# Patient Record
Sex: FEMALE | URBAN - METROPOLITAN AREA
[De-identification: names, ages, dates, MRNs, and addresses within clinical notes are randomized per-mention and may not be internally consistent; named-entity substitution may affect disease eponyms.]

---

## 2022-08-29 ENCOUNTER — OFFICE VISIT (OUTPATIENT)
Dept: URBAN - METROPOLITAN AREA CLINIC 36 | Facility: CLINIC | Age: 77
End: 2022-08-29
Payer: COMMERCIAL

## 2022-08-29 DIAGNOSIS — H40.9 GLAUCOMA: ICD-10-CM

## 2022-08-29 DIAGNOSIS — Z96.1 PRESENCE OF INTRAOCULAR LENS: ICD-10-CM

## 2022-08-29 DIAGNOSIS — H35.033 HYPERTENSIVE RETINOPATHY, BILATERAL: ICD-10-CM

## 2022-08-29 DIAGNOSIS — H34.8130 CENTRAL RETINAL VEIN OCCLUSION, BILATERAL, WITH MACULAR EDEMA: Primary | ICD-10-CM

## 2022-08-29 PROCEDURE — 92134 CPTRZ OPH DX IMG PST SGM RTA: CPT | Performed by: OPHTHALMOLOGY

## 2022-08-29 PROCEDURE — 99204 OFFICE O/P NEW MOD 45 MIN: CPT | Performed by: OPHTHALMOLOGY

## 2022-08-29 PROCEDURE — 67028 INJECTION EYE DRUG: CPT | Performed by: OPHTHALMOLOGY

## 2022-08-29 ASSESSMENT — INTRAOCULAR PRESSURE
OS: 21
OD: 16

## 2022-08-29 NOTE — IMPRESSION/PLAN
Impression: Presence of intraocular lens: Z96.1. Bilateral. Plan:  PC IOL in good position.  Observe

## 2022-08-29 NOTE — IMPRESSION/PLAN
Impression: Central retinal vein occlusion, with macular edema, OD>OS: H34.8130.
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 THe findings are consistent with HTN retinopathy vs CRVO. The diagnosis, natural history, prognosis, and R/B/A of the various treatment options were discussed at length. We discussed that treatment may or may not improve vision, but should reduce the risk of further visual loss; we discussed that repeat treatment is usually necessary to maintain any vision gains and prevent further vision loss. Recommend intravitreal Avastin injection today OD and observation. R/B/A discussed and patient elects to proceed. Intravitreal Avastin was injected OD without complication. Yakelin Olivo The patient was educated regarding the systemic conditions associated with a RVO and was strongly advised to schedule an appointment with their PCP for a full medical evaluation and appropriate bloodwork within the next 2 weeks. 

RTC 4 weeks with OCT OU, poss Avastin

## 2022-10-03 ENCOUNTER — OFFICE VISIT (OUTPATIENT)
Dept: URBAN - METROPOLITAN AREA CLINIC 36 | Facility: CLINIC | Age: 77
End: 2022-10-03
Payer: COMMERCIAL

## 2022-10-03 DIAGNOSIS — Z96.1 PRESENCE OF INTRAOCULAR LENS: ICD-10-CM

## 2022-10-03 DIAGNOSIS — H34.8130 CENTRAL RETINAL VEIN OCCLUSION, BILATERAL, WITH MACULAR EDEMA: Primary | ICD-10-CM

## 2022-10-03 DIAGNOSIS — H40.9 GLAUCOMA: ICD-10-CM

## 2022-10-03 DIAGNOSIS — H35.033 HYPERTENSIVE RETINOPATHY, BILATERAL: ICD-10-CM

## 2022-10-03 PROCEDURE — 67028 INJECTION EYE DRUG: CPT | Performed by: OPHTHALMOLOGY

## 2022-10-03 PROCEDURE — 92134 CPTRZ OPH DX IMG PST SGM RTA: CPT | Performed by: OPHTHALMOLOGY

## 2022-10-03 PROCEDURE — 99214 OFFICE O/P EST MOD 30 MIN: CPT | Performed by: OPHTHALMOLOGY

## 2022-10-03 ASSESSMENT — INTRAOCULAR PRESSURE
OS: 20
OD: 16

## 2022-10-03 NOTE — IMPRESSION/PLAN
Impression: Glaucoma: H40.9. Plan: Exam demonstrates glaucomatous cupping. Fortunately, there is no NVI today. IOP is borderline OS and there is no progression of ON cupping. Discussed surgery vs laser vs gtts vs observation.   Recommend CPM

## 2022-10-03 NOTE — IMPRESSION/PLAN
Impression: Central retinal vein occlusion, with macular edema, OD>OS: H34.8130.
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
-s/p Avastin last 08/29/2022 Plan: Exam and OCT demonstrate scattered MAs with improved CME OD and slightly worse CME OS. The findings are consistent with HTN retinopathy vs CRVO with improvement OD after Avastin. The diagnosis, natural history, prognosis, and R/B/A of the various treatment options were discussed at length. We discussed that treatment may or may not improve vision, but should reduce the risk of further visual loss; we discussed that repeat treatment is usually necessary to maintain any vision gains and prevent further vision loss. Recommend intravitreal Avastin injection today OD and close observation OS. R/B/A discussed and patient elects to proceed. Intravitreal Avastin was injected OD without complication. Chaz Bo Close Observation OS.  

RTC 4 weeks with OCT OU, poss Avastin (OD vs OU)

## 2022-10-31 ENCOUNTER — OFFICE VISIT (OUTPATIENT)
Dept: URBAN - METROPOLITAN AREA CLINIC 36 | Facility: CLINIC | Age: 77
End: 2022-10-31
Payer: COMMERCIAL

## 2022-10-31 DIAGNOSIS — H34.8130 CENTRAL RETINAL VEIN OCCLUSION, BILATERAL, WITH MACULAR EDEMA: Primary | ICD-10-CM

## 2022-10-31 DIAGNOSIS — H40.9 GLAUCOMA: ICD-10-CM

## 2022-10-31 DIAGNOSIS — Z96.1 PRESENCE OF INTRAOCULAR LENS: ICD-10-CM

## 2022-10-31 DIAGNOSIS — H35.033 HYPERTENSIVE RETINOPATHY, BILATERAL: ICD-10-CM

## 2022-10-31 PROCEDURE — 92012 INTRM OPH EXAM EST PATIENT: CPT | Performed by: OPHTHALMOLOGY

## 2022-10-31 PROCEDURE — 67028 INJECTION EYE DRUG: CPT | Performed by: OPHTHALMOLOGY

## 2022-10-31 PROCEDURE — 92134 CPTRZ OPH DX IMG PST SGM RTA: CPT | Performed by: OPHTHALMOLOGY

## 2022-10-31 ASSESSMENT — INTRAOCULAR PRESSURE
OS: 18
OD: 20

## 2022-10-31 NOTE — IMPRESSION/PLAN
Impression: Central retinal vein occlusion, with macular edema, OD>OS: H34.8130.
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
-s/p Avastin OD; 10/03/2022 Plan: Exam and OCT demonstrate scattered MAs with persistent CME OD and stable CME OS. The findings are consistent with HTN retinopathy vs CRVO. The diagnosis, natural history, prognosis, and R/B/A of the various treatment options were discussed at length. We discussed that treatment may or may not improve vision, but should reduce the risk of further visual loss; we discussed that repeat treatment is usually necessary to maintain any vision gains and prevent further vision loss. Recommend intravitreal Avastin injection today OD and switch to [facility] given the persistent CME and blurry vision on Avastin. Rec continued close observation OS. R/B/A discussed and patient elects to proceed. Intravitreal Avastin was injected OD without complication. Elmer Kelly Close Observation OS.  

RTC 4 weeks with OCT OU, poss Eylea (OD vs OU)

## 2022-11-28 ENCOUNTER — OFFICE VISIT (OUTPATIENT)
Dept: URBAN - METROPOLITAN AREA CLINIC 36 | Facility: CLINIC | Age: 77
End: 2022-11-28
Payer: MEDICARE

## 2022-11-28 DIAGNOSIS — H40.9 GLAUCOMA: ICD-10-CM

## 2022-11-28 DIAGNOSIS — Z96.1 PRESENCE OF INTRAOCULAR LENS: ICD-10-CM

## 2022-11-28 DIAGNOSIS — H35.033 HYPERTENSIVE RETINOPATHY, BILATERAL: ICD-10-CM

## 2022-11-28 DIAGNOSIS — H34.8130 CENTRAL RETINAL VEIN OCCLUSION, BILATERAL, WITH MACULAR EDEMA: Primary | ICD-10-CM

## 2022-11-28 PROCEDURE — 67028 INJECTION EYE DRUG: CPT | Performed by: OPHTHALMOLOGY

## 2022-11-28 PROCEDURE — 99214 OFFICE O/P EST MOD 30 MIN: CPT | Performed by: OPHTHALMOLOGY

## 2022-11-28 PROCEDURE — 92134 CPTRZ OPH DX IMG PST SGM RTA: CPT | Performed by: OPHTHALMOLOGY

## 2022-11-28 ASSESSMENT — INTRAOCULAR PRESSURE
OS: 22
OD: 16

## 2022-11-28 NOTE — IMPRESSION/PLAN
Impression: Central retinal vein occlusion, with macular edema, OD>OS: H34.8130.
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
-s/p Avastin OD 10/31/2022 Plan: Exam and OCT demonstrate scattered MAs with improved CME OD and stable CME OS. The findings are consistent with HTN retinopathy vs CRVO. Recommend intravitreal Avastin injection today OD and switch to [facility] given the persistent CME and blurry vision on Avastin. Rec continued close observation OS. R/B/A discussed and patient elects to proceed. Intravitreal Avastin was injected OD without complication. Tsering Zacarias Close Observation OS.  

RTC 4-6 weeks with OCT OU, poss Eylea (OD vs OU)

## 2023-01-23 ENCOUNTER — OFFICE VISIT (OUTPATIENT)
Dept: URBAN - METROPOLITAN AREA CLINIC 36 | Facility: CLINIC | Age: 78
End: 2023-01-23
Payer: MEDICARE

## 2023-01-23 DIAGNOSIS — Z96.1 PRESENCE OF INTRAOCULAR LENS: ICD-10-CM

## 2023-01-23 DIAGNOSIS — H40.9 GLAUCOMA: ICD-10-CM

## 2023-01-23 DIAGNOSIS — H35.033 HYPERTENSIVE RETINOPATHY, BILATERAL: ICD-10-CM

## 2023-01-23 DIAGNOSIS — H34.8130 CENTRAL RETINAL VEIN OCCLUSION, BILATERAL, WITH MACULAR EDEMA: Primary | ICD-10-CM

## 2023-01-23 PROCEDURE — 99214 OFFICE O/P EST MOD 30 MIN: CPT | Performed by: OPHTHALMOLOGY

## 2023-01-23 PROCEDURE — 92134 CPTRZ OPH DX IMG PST SGM RTA: CPT | Performed by: OPHTHALMOLOGY

## 2023-01-23 ASSESSMENT — INTRAOCULAR PRESSURE
OD: 19
OS: 21

## 2023-01-23 NOTE — IMPRESSION/PLAN
Impression: Central retinal vein occlusion, with macular edema, OD>OS: H34.8130.
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
-s/p Avastin OD 11/28/2022 Plan: Exam and OCT demonstrate scattered MAs with persistent CME OD and CME OS. The findings are consistent with HTN retinopathy vs CRVO. Recommend intravitreal Avastin injection today OD and switch to [facility] given the persistent CME and blurry vision on Avastin. Rec trial of Avastin OS today given persistent CME.  R/B/A discussed and patient elects to proceed. Intravitreal Avastin was injected OD without complication. Prisca Mosher Intravitreal Avastin #1 injection performed OS today without complication RTC 4-6 weeks with OCT OU, poss Eylea (OD vs OU)

## 2023-03-06 ENCOUNTER — OFFICE VISIT (OUTPATIENT)
Dept: URBAN - METROPOLITAN AREA CLINIC 36 | Facility: CLINIC | Age: 78
End: 2023-03-06
Payer: MEDICARE

## 2023-03-06 DIAGNOSIS — H40.9 GLAUCOMA: ICD-10-CM

## 2023-03-06 DIAGNOSIS — H35.033 HYPERTENSIVE RETINOPATHY, BILATERAL: ICD-10-CM

## 2023-03-06 DIAGNOSIS — H34.8130 CENTRAL RETINAL VEIN OCCLUSION, BILATERAL, WITH MACULAR EDEMA: Primary | ICD-10-CM

## 2023-03-06 DIAGNOSIS — Z96.1 PRESENCE OF INTRAOCULAR LENS: ICD-10-CM

## 2023-03-06 PROCEDURE — 92134 CPTRZ OPH DX IMG PST SGM RTA: CPT | Performed by: OPHTHALMOLOGY

## 2023-03-06 PROCEDURE — 99214 OFFICE O/P EST MOD 30 MIN: CPT | Performed by: OPHTHALMOLOGY

## 2023-03-06 ASSESSMENT — INTRAOCULAR PRESSURE
OS: 18
OD: 15

## 2023-03-06 NOTE — IMPRESSION/PLAN
Impression: Presence of intraocular lens: Z96.1. Bilateral. Plan: PC IOL in good position.  Observe 18-Jan-2019 15:45

## 2023-03-06 NOTE — IMPRESSION/PLAN
Impression: Central retinal vein occlusion, with macular edema, OD>OS: H34.8130.
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
-s/p Avastin OU 01/23/2023 Plan: Exam and OCT demonstrate scattered MAs with persistent CME OD and CME OS. The findings are consistent with HTN retinopathy vs CRVO. Recommend switch to [facility] given the persistent CME and blurry vision on Avastin. R/B/A discussed and patient elects to proceed. Intravitreal Eylea was injected OD without complication. Joanne Goldberg Intravitreal Eylea injection performed OS today without complication RTC 4-6 weeks with OCT OU, poss Eylea (OD vs OU)

## 2023-04-03 ENCOUNTER — OFFICE VISIT (OUTPATIENT)
Dept: URBAN - METROPOLITAN AREA CLINIC 36 | Facility: CLINIC | Age: 78
End: 2023-04-03
Payer: MEDICARE

## 2023-04-03 DIAGNOSIS — H35.033 HYPERTENSIVE RETINOPATHY, BILATERAL: ICD-10-CM

## 2023-04-03 DIAGNOSIS — H40.9 GLAUCOMA: ICD-10-CM

## 2023-04-03 DIAGNOSIS — S05.12XA CONTUSION OF EYEBALL AND ORBITAL TISSUES, LEFT EYE, INIT: ICD-10-CM

## 2023-04-03 DIAGNOSIS — Z96.1 PRESENCE OF INTRAOCULAR LENS: ICD-10-CM

## 2023-04-03 DIAGNOSIS — H34.8130 CENTRAL RETINAL VEIN OCCLUSION, BILATERAL, WITH MACULAR EDEMA: Primary | ICD-10-CM

## 2023-04-03 PROCEDURE — 92134 CPTRZ OPH DX IMG PST SGM RTA: CPT | Performed by: OPHTHALMOLOGY

## 2023-04-03 PROCEDURE — 92012 INTRM OPH EXAM EST PATIENT: CPT | Performed by: OPHTHALMOLOGY

## 2023-04-03 ASSESSMENT — INTRAOCULAR PRESSURE
OD: 13
OS: 17

## 2023-04-03 NOTE — IMPRESSION/PLAN
Impression: Contusion of eyeball and orbital tissues, OS>OD, init: S05.12xA. Plan: Patient recently fell. Exam demonstrates periocular ecchymosis. No evidence of orbital fracture. No intraocular involvement.

## 2023-04-03 NOTE — IMPRESSION/PLAN
Impression: Central retinal vein occlusion, with macular edema, OD>OS: H34.8130.
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
-s/p Avastin OU 03/06/2023
-s/p Eylea 03/06/2023 Plan: Exam and OCT demonstrate scattered MAs with persistent CME OD and CME OS; this is improved after switch to [facility]. The findings are consistent with HTN retinopathy vs CRVO. Recommend observation today given recent periocular trauma RTC 4-6 weeks with OCT OU, poss Eylea (OD vs OU)

## 2023-05-01 ENCOUNTER — OFFICE VISIT (OUTPATIENT)
Dept: URBAN - METROPOLITAN AREA CLINIC 36 | Facility: CLINIC | Age: 78
End: 2023-05-01
Payer: MEDICARE

## 2023-05-01 DIAGNOSIS — H35.033 HYPERTENSIVE RETINOPATHY, BILATERAL: ICD-10-CM

## 2023-05-01 DIAGNOSIS — S05.12XA CONTUSION OF EYEBALL AND ORBITAL TISSUES, LEFT EYE, INIT: ICD-10-CM

## 2023-05-01 DIAGNOSIS — H40.9 GLAUCOMA: ICD-10-CM

## 2023-05-01 DIAGNOSIS — H34.8130 CENTRAL RETINAL VEIN OCCLUSION, BILATERAL, WITH MACULAR EDEMA: Primary | ICD-10-CM

## 2023-05-01 DIAGNOSIS — Z96.1 PRESENCE OF INTRAOCULAR LENS: ICD-10-CM

## 2023-05-01 PROCEDURE — 92012 INTRM OPH EXAM EST PATIENT: CPT | Performed by: OPHTHALMOLOGY

## 2023-05-01 PROCEDURE — 92134 CPTRZ OPH DX IMG PST SGM RTA: CPT | Performed by: OPHTHALMOLOGY

## 2023-05-01 ASSESSMENT — INTRAOCULAR PRESSURE
OD: 16
OS: 16

## 2023-05-01 NOTE — IMPRESSION/PLAN
Impression: Contusion of eyeball and orbital tissues, OS>OD, init: S05.12xA. Plan: Due to Fall. Exam demonstrates improved periocular ecchymosis. No evidence of orbital fracture. No intraocular involvement.

## 2023-05-01 NOTE — IMPRESSION/PLAN
Impression: Central retinal vein occlusion, with macular edema, OD>OS: H34.8130.
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
-s/p Avastin OU 03/06/2023
-s/p Eylea 03/06/2023 Plan: Exam and OCT demonstrate scattered MAs with slightly worse CME OD and CME OS at 8 wks. The findings are consistent with HTN retinopathy vs CRVO. Recommend Eylea today OU. R/B/A discussed and patient elects to proceed. Intravitreal Eylea injected OU today without complication.  

RTC 6-8 weeks with OCT OU, poss Eylea (OD vs OU)

## 2023-06-26 ENCOUNTER — PROCEDURE (OUTPATIENT)
Dept: URBAN - METROPOLITAN AREA CLINIC 36 | Facility: CLINIC | Age: 78
End: 2023-06-26
Payer: MEDICARE

## 2023-06-26 DIAGNOSIS — S05.12XA CONTUSION OF EYEBALL AND ORBITAL TISSUES, LEFT EYE, INIT: ICD-10-CM

## 2023-06-26 DIAGNOSIS — Z96.1 PRESENCE OF INTRAOCULAR LENS: ICD-10-CM

## 2023-06-26 DIAGNOSIS — H35.033 HYPERTENSIVE RETINOPATHY, BILATERAL: ICD-10-CM

## 2023-06-26 DIAGNOSIS — H40.9 GLAUCOMA: ICD-10-CM

## 2023-06-26 DIAGNOSIS — H34.8130 CENTRAL RETINAL VEIN OCCLUSION, BILATERAL, WITH MACULAR EDEMA: Primary | ICD-10-CM

## 2023-06-26 PROCEDURE — 92014 COMPRE OPH EXAM EST PT 1/>: CPT | Performed by: OPHTHALMOLOGY

## 2023-06-26 PROCEDURE — 92134 CPTRZ OPH DX IMG PST SGM RTA: CPT | Performed by: OPHTHALMOLOGY

## 2023-06-26 ASSESSMENT — INTRAOCULAR PRESSURE
OD: 13
OS: 18

## 2023-06-26 NOTE — IMPRESSION/PLAN
Impression: Central retinal vein occlusion, with macular edema, OD>OS: H34.8130.
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
-s/p Avastin OU 03/06/2023
-s/p Eylea 05/01/2023 Plan: Exam and OCT demonstrate scattered MAs with slightly worse CME OD and CME OS at 8 wks. The findings are consistent with HTN retinopathy vs CRVO. Recommend Eylea today OU. R/B/A discussed and patient elects to proceed. Intravitreal Eylea injected OU today without complication.  

RTC 6-8 weeks with OCT OU, poss Eylea (OD vs OU)

## 2023-08-09 ENCOUNTER — OFFICE VISIT (OUTPATIENT)
Facility: LOCATION | Age: 78
End: 2023-08-09
Payer: MEDICARE

## 2023-08-09 DIAGNOSIS — H34.8130 CENTRAL RETINAL VEIN OCCLUSION, BILATERAL, WITH MACULAR EDEMA: Primary | ICD-10-CM

## 2023-08-09 DIAGNOSIS — H40.9 GLAUCOMA: ICD-10-CM

## 2023-08-09 DIAGNOSIS — S05.12XA CONTUSION OF EYEBALL AND ORBITAL TISSUES, LEFT EYE, INIT: ICD-10-CM

## 2023-08-09 DIAGNOSIS — H35.033 HYPERTENSIVE RETINOPATHY, BILATERAL: ICD-10-CM

## 2023-08-09 DIAGNOSIS — Z96.1 PRESENCE OF INTRAOCULAR LENS: ICD-10-CM

## 2023-08-09 PROCEDURE — 99214 OFFICE O/P EST MOD 30 MIN: CPT | Performed by: OPHTHALMOLOGY

## 2023-08-09 PROCEDURE — 92134 CPTRZ OPH DX IMG PST SGM RTA: CPT | Performed by: OPHTHALMOLOGY

## 2023-08-09 ASSESSMENT — INTRAOCULAR PRESSURE
OD: 21
OS: 22

## 2023-10-04 ENCOUNTER — OFFICE VISIT (OUTPATIENT)
Dept: URBAN - METROPOLITAN AREA CLINIC 41 | Facility: CLINIC | Age: 78
End: 2023-10-04
Payer: MEDICARE

## 2023-10-04 DIAGNOSIS — Z96.1 PRESENCE OF INTRAOCULAR LENS: ICD-10-CM

## 2023-10-04 DIAGNOSIS — H34.8130 CENTRAL RETINAL VEIN OCCLUSION, BILATERAL, WITH MACULAR EDEMA: Primary | ICD-10-CM

## 2023-10-04 DIAGNOSIS — H02.409 PTOSIS OF EYELID: ICD-10-CM

## 2023-10-04 DIAGNOSIS — H40.9 GLAUCOMA: ICD-10-CM

## 2023-10-04 DIAGNOSIS — H35.033 HYPERTENSIVE RETINOPATHY, BILATERAL: ICD-10-CM

## 2023-10-04 DIAGNOSIS — S05.12XA CONTUSION OF EYEBALL AND ORBITAL TISSUES, LEFT EYE, INIT: ICD-10-CM

## 2023-10-04 PROCEDURE — 92134 CPTRZ OPH DX IMG PST SGM RTA: CPT | Performed by: OPHTHALMOLOGY

## 2023-10-04 PROCEDURE — 92014 COMPRE OPH EXAM EST PT 1/>: CPT | Performed by: OPHTHALMOLOGY

## 2023-10-04 ASSESSMENT — INTRAOCULAR PRESSURE
OS: 17
OD: 18

## 2024-01-04 ENCOUNTER — OFFICE VISIT (OUTPATIENT)
Dept: URBAN - METROPOLITAN AREA CLINIC 27 | Facility: CLINIC | Age: 79
End: 2024-01-04
Payer: MEDICARE

## 2024-01-04 DIAGNOSIS — S05.12XA CONTUSION OF EYEBALL AND ORBITAL TISSUES, LEFT EYE, INIT: ICD-10-CM

## 2024-01-04 DIAGNOSIS — H35.033 HYPERTENSIVE RETINOPATHY, BILATERAL: ICD-10-CM

## 2024-01-04 DIAGNOSIS — Z96.1 PRESENCE OF INTRAOCULAR LENS: ICD-10-CM

## 2024-01-04 DIAGNOSIS — H40.9 GLAUCOMA: ICD-10-CM

## 2024-01-04 DIAGNOSIS — H34.8130 CENTRAL RETINAL VEIN OCCLUSION, BILATERAL, WITH MACULAR EDEMA: Primary | ICD-10-CM

## 2024-01-04 PROCEDURE — 92014 COMPRE OPH EXAM EST PT 1/>: CPT | Performed by: OPHTHALMOLOGY

## 2024-01-04 PROCEDURE — 92134 CPTRZ OPH DX IMG PST SGM RTA: CPT | Performed by: OPHTHALMOLOGY

## 2024-01-04 ASSESSMENT — INTRAOCULAR PRESSURE
OS: 20
OD: 18

## 2024-02-29 ENCOUNTER — OFFICE VISIT (OUTPATIENT)
Dept: URBAN - METROPOLITAN AREA CLINIC 27 | Facility: CLINIC | Age: 79
End: 2024-02-29
Payer: MEDICARE

## 2024-02-29 DIAGNOSIS — H40.9 GLAUCOMA: ICD-10-CM

## 2024-02-29 DIAGNOSIS — H34.8130 CENTRAL RETINAL VEIN OCCLUSION, BILATERAL, WITH MACULAR EDEMA: Primary | ICD-10-CM

## 2024-02-29 DIAGNOSIS — Z96.1 PRESENCE OF INTRAOCULAR LENS: ICD-10-CM

## 2024-02-29 DIAGNOSIS — S05.12XA CONTUSION OF EYEBALL AND ORBITAL TISSUES, LEFT EYE, INIT: ICD-10-CM

## 2024-02-29 PROCEDURE — 92134 CPTRZ OPH DX IMG PST SGM RTA: CPT | Performed by: OPHTHALMOLOGY

## 2024-02-29 PROCEDURE — 92014 COMPRE OPH EXAM EST PT 1/>: CPT | Performed by: OPHTHALMOLOGY

## 2024-02-29 ASSESSMENT — INTRAOCULAR PRESSURE
OD: 20
OS: 21

## 2024-05-04 ENCOUNTER — OFFICE VISIT (OUTPATIENT)
Dept: URBAN - METROPOLITAN AREA CLINIC 51 | Facility: CLINIC | Age: 79
End: 2024-05-04
Payer: MEDICARE

## 2024-05-04 DIAGNOSIS — H04.123 DRY EYE SYNDROME OF BILATERAL LACRIMAL GLANDS: ICD-10-CM

## 2024-05-04 DIAGNOSIS — H02.423 MYOGENIC PTOSIS OF BILATERAL EYELIDS: Primary | ICD-10-CM

## 2024-05-04 PROCEDURE — 92285 EXTERNAL OCULAR PHOTOGRAPHY: CPT | Performed by: OPHTHALMOLOGY

## 2024-05-04 PROCEDURE — 99204 OFFICE O/P NEW MOD 45 MIN: CPT | Performed by: OPHTHALMOLOGY

## 2024-05-04 PROCEDURE — 92082 INTERMEDIATE VISUAL FIELD XM: CPT | Performed by: OPHTHALMOLOGY

## 2024-05-09 ENCOUNTER — OFFICE VISIT (OUTPATIENT)
Dept: URBAN - METROPOLITAN AREA CLINIC 27 | Facility: CLINIC | Age: 79
End: 2024-05-09
Payer: MEDICARE

## 2024-05-09 DIAGNOSIS — H34.8130 CENTRAL RETINAL VEIN OCCLUSION, BILATERAL, WITH MACULAR EDEMA: Primary | ICD-10-CM

## 2024-05-09 PROCEDURE — 92134 CPTRZ OPH DX IMG PST SGM RTA: CPT | Performed by: OPHTHALMOLOGY

## 2024-05-09 ASSESSMENT — INTRAOCULAR PRESSURE
OS: 18
OD: 15

## 2024-06-25 ENCOUNTER — ADULT PHYSICAL (OUTPATIENT)
Dept: URBAN - METROPOLITAN AREA CLINIC 30 | Facility: CLINIC | Age: 79
End: 2024-06-25
Payer: MEDICARE

## 2024-06-25 DIAGNOSIS — Z01.818 ENCOUNTER FOR OTHER PREPROCEDURAL EXAMINATION: Primary | ICD-10-CM

## 2024-06-25 DIAGNOSIS — H02.423 MYOGENIC PTOSIS OF BILATERAL EYELIDS: ICD-10-CM

## 2024-06-25 PROCEDURE — 99203 OFFICE O/P NEW LOW 30 MIN: CPT

## 2024-06-25 RX ORDER — LISINOPRIL 40 MG/1
40 MG TABLET ORAL
Qty: 0 | Refills: 0 | Status: ACTIVE
Start: 2024-06-25

## 2024-06-25 RX ORDER — TRAVOPROST OPHTHALMIC SOLUTION, 0.004% 0.04 MG/ML
0.004 % SOLUTION/ DROPS OPHTHALMIC
Qty: 0 | Refills: 0 | Status: ACTIVE
Start: 2024-06-25

## 2024-06-25 RX ORDER — ROSUVASTATIN CALCIUM 5 MG/1
5 MG TABLET, FILM COATED ORAL
Qty: 0 | Refills: 0 | Status: ACTIVE
Start: 2024-06-25

## 2024-06-25 RX ORDER — METOPROLOL SUCCINATE 50 MG/1
50 MG TABLET, FILM COATED, EXTENDED RELEASE ORAL
Qty: 0 | Refills: 0 | Status: ACTIVE
Start: 2024-06-25

## 2024-06-25 RX ORDER — PREGABALIN 75 MG/1
75 MG CAPSULE ORAL
Qty: 0 | Refills: 0 | Status: ACTIVE
Start: 2024-06-25

## 2024-06-25 RX ORDER — CARISOPRODOL 350 MG/1
350 MG TABLET ORAL
Qty: 0 | Refills: 0 | Status: ACTIVE
Start: 2024-06-25

## 2024-06-25 RX ORDER — ASPIRIN 81 MG/1
81 MG TABLET, COATED ORAL
Qty: 0 | Refills: 0 | Status: ACTIVE
Start: 2024-06-25

## 2024-06-25 RX ORDER — ZOLPIDEM TARTRATE 10 MG/1
10 MG TABLET, FILM COATED ORAL
Qty: 0 | Refills: 0 | Status: ACTIVE
Start: 2024-06-25

## 2024-06-25 RX ORDER — TIMOLOL MALEATE 6.8 MG/ML
0.5 % SOLUTION/ DROPS OPHTHALMIC
Qty: 0 | Refills: 0 | Status: ACTIVE
Start: 2024-06-25

## 2024-07-18 ENCOUNTER — OFFICE VISIT (OUTPATIENT)
Dept: URBAN - METROPOLITAN AREA CLINIC 27 | Facility: CLINIC | Age: 79
End: 2024-07-18
Payer: MEDICARE

## 2024-07-18 DIAGNOSIS — H34.8130 CENTRAL RETINAL VEIN OCCLUSION, BILATERAL, WITH MACULAR EDEMA: Primary | ICD-10-CM

## 2024-07-18 PROCEDURE — 92134 CPTRZ OPH DX IMG PST SGM RTA: CPT | Performed by: OPHTHALMOLOGY

## 2024-07-18 ASSESSMENT — INTRAOCULAR PRESSURE
OS: 20
OD: 22

## 2024-07-29 ENCOUNTER — POST-OPERATIVE VISIT (OUTPATIENT)
Dept: URBAN - METROPOLITAN AREA CLINIC 30 | Facility: CLINIC | Age: 79
End: 2024-07-29
Payer: MEDICARE

## 2024-07-29 DIAGNOSIS — Z48.89 ENCOUNTER FOR OTHER SPECIFIED SURGICAL AFTERCARE: Primary | ICD-10-CM

## 2024-07-29 PROCEDURE — 99024 POSTOP FOLLOW-UP VISIT: CPT

## 2024-07-29 ASSESSMENT — INTRAOCULAR PRESSURE
OS: 16
OD: 16

## 2024-09-12 ENCOUNTER — OFFICE VISIT (OUTPATIENT)
Dept: URBAN - METROPOLITAN AREA CLINIC 27 | Facility: CLINIC | Age: 79
End: 2024-09-12
Payer: MEDICARE

## 2024-09-12 DIAGNOSIS — H34.8130 CENTRAL RETINAL VEIN OCCLUSION, BILATERAL, WITH MACULAR EDEMA: Primary | ICD-10-CM

## 2024-09-12 PROCEDURE — 92134 CPTRZ OPH DX IMG PST SGM RTA: CPT | Performed by: OPHTHALMOLOGY

## 2024-09-12 ASSESSMENT — INTRAOCULAR PRESSURE
OS: 24
OD: 19

## 2024-11-25 ENCOUNTER — OFFICE VISIT (OUTPATIENT)
Facility: LOCATION | Age: 79
End: 2024-11-25
Payer: MEDICARE

## 2024-11-25 DIAGNOSIS — Z96.1 PRESENCE OF INTRAOCULAR LENS: ICD-10-CM

## 2024-11-25 DIAGNOSIS — H40.9 GLAUCOMA: ICD-10-CM

## 2024-11-25 DIAGNOSIS — H34.8130 CENTRAL RETINAL VEIN OCCLUSION, BILATERAL, WITH MACULAR EDEMA: Primary | ICD-10-CM

## 2024-11-25 DIAGNOSIS — S05.12XA CONTUSION OF EYEBALL AND ORBITAL TISSUES, LEFT EYE, INIT: ICD-10-CM

## 2024-11-25 PROCEDURE — 92014 COMPRE OPH EXAM EST PT 1/>: CPT | Performed by: OPHTHALMOLOGY

## 2024-11-25 PROCEDURE — 92134 CPTRZ OPH DX IMG PST SGM RTA: CPT | Performed by: OPHTHALMOLOGY

## 2024-11-25 ASSESSMENT — INTRAOCULAR PRESSURE
OS: 27
OD: 21